# Patient Record
Sex: MALE | Race: WHITE | NOT HISPANIC OR LATINO | Employment: OTHER | URBAN - NONMETROPOLITAN AREA
[De-identification: names, ages, dates, MRNs, and addresses within clinical notes are randomized per-mention and may not be internally consistent; named-entity substitution may affect disease eponyms.]

---

## 2018-06-18 ENCOUNTER — TRANSCRIBE ORDERS (OUTPATIENT)
Dept: ADMINISTRATIVE | Facility: HOSPITAL | Age: 68
End: 2018-06-18

## 2018-06-18 DIAGNOSIS — R07.9 CHEST PAIN, UNSPECIFIED TYPE: Primary | ICD-10-CM

## 2018-06-18 DIAGNOSIS — R53.83 LETHARGY: ICD-10-CM

## 2018-06-19 ENCOUNTER — HOSPITAL ENCOUNTER (OUTPATIENT)
Dept: CARDIOLOGY | Facility: HOSPITAL | Age: 68
Discharge: HOME | End: 2018-06-19
Admitting: GENERAL PRACTICE

## 2018-06-19 VITALS
BODY MASS INDEX: 26.21 KG/M2 | HEART RATE: 68 BPM | SYSTOLIC BLOOD PRESSURE: 168 MMHG | DIASTOLIC BLOOD PRESSURE: 109 MMHG | HEIGHT: 67 IN | WEIGHT: 167 LBS

## 2018-06-19 DIAGNOSIS — R07.9 CHEST PAIN, UNSPECIFIED TYPE: ICD-10-CM

## 2018-06-19 DIAGNOSIS — R53.83 LETHARGY: ICD-10-CM

## 2018-06-19 PROCEDURE — 93350 STRESS TTE ONLY: CPT | Performed by: INTERNAL MEDICINE

## 2018-06-19 PROCEDURE — 25010000002 PERFLUTREN 6.52 MG/ML SUSPENSION: Performed by: INTERNAL MEDICINE

## 2018-06-19 PROCEDURE — 93018 CV STRESS TEST I&R ONLY: CPT | Performed by: INTERNAL MEDICINE

## 2018-06-19 PROCEDURE — 93350 STRESS TTE ONLY: CPT

## 2018-06-19 PROCEDURE — 93352 ADMIN ECG CONTRAST AGENT: CPT | Performed by: INTERNAL MEDICINE

## 2018-06-19 PROCEDURE — 93017 CV STRESS TEST TRACING ONLY: CPT

## 2018-06-19 RX ORDER — HYDROCODONE BITARTRATE AND ACETAMINOPHEN 5; 325 MG/1; MG/1
1 TABLET ORAL EVERY 6 HOURS PRN
COMMUNITY

## 2018-06-19 RX ADMIN — PERFLUTREN 8.48 MG: 6.52 INJECTION, SUSPENSION INTRAVENOUS at 13:49

## 2018-06-21 LAB
BH CV STRESS BP STAGE 1: NORMAL
BH CV STRESS BP STAGE 2: NORMAL
BH CV STRESS DURATION MIN STAGE 1: 3
BH CV STRESS DURATION MIN STAGE 2: 2
BH CV STRESS DURATION SEC STAGE 1: 0
BH CV STRESS DURATION SEC STAGE 2: 22
BH CV STRESS ECHO POST STRESS EJECTION FRACTION EF: 65 %
BH CV STRESS GRADE STAGE 1: 10
BH CV STRESS GRADE STAGE 2: 12
BH CV STRESS HR STAGE 1: 112
BH CV STRESS HR STAGE 2: 125
BH CV STRESS METS STAGE 1: 5
BH CV STRESS METS STAGE 2: 7.5
BH CV STRESS PROTOCOL 1: NORMAL
BH CV STRESS RECOVERY BP: NORMAL MMHG
BH CV STRESS RECOVERY HR: 80 BPM
BH CV STRESS SPEED STAGE 1: 1.7
BH CV STRESS SPEED STAGE 2: 2.5
BH CV STRESS STAGE 1: 1
BH CV STRESS STAGE 2: 2
LV EF 2D ECHO EST: 55 %
MAXIMAL PREDICTED HEART RATE: 151 BPM
PERCENT MAX PREDICTED HR: 82.78 %
STRESS BASELINE BP: NORMAL MMHG
STRESS BASELINE HR: 83 BPM
STRESS PERCENT HR: 97 %
STRESS POST ESTIMATED WORKLOAD: 7.5 METS
STRESS POST EXERCISE DUR MIN: 5 MIN
STRESS POST EXERCISE DUR SEC: 22 SEC
STRESS POST PEAK BP: NORMAL MMHG
STRESS POST PEAK HR: 125 BPM
STRESS TARGET HR: 128 BPM

## 2019-04-23 ENCOUNTER — TRANSCRIBE ORDERS (OUTPATIENT)
Dept: ADMINISTRATIVE | Facility: HOSPITAL | Age: 69
End: 2019-04-23

## 2019-04-23 DIAGNOSIS — R10.11 ABDOMINAL PAIN, RIGHT UPPER QUADRANT: Primary | ICD-10-CM

## 2019-04-26 ENCOUNTER — HOSPITAL ENCOUNTER (OUTPATIENT)
Dept: ULTRASOUND IMAGING | Facility: HOSPITAL | Age: 69
Discharge: HOME | End: 2019-04-26
Admitting: GENERAL PRACTICE

## 2019-04-26 PROCEDURE — 76705 ECHO EXAM OF ABDOMEN: CPT

## 2019-05-06 NOTE — H&P (VIEW-ONLY)
"Chief Complaint   Patient presents with   • Heartburn     having bad acid reflux bad running down arms        PCP: Fazal Killian MD  REFER: Fazal Killian MD    Subjective     HPI    Chest pain fall 2018 with negative cardiac workup.  Over past several week increasing YULIANA pain described as \"squeezing.\"  Pain is intermittent worse with bending over and activity.  Pain radiates down both arms.  Rest provides relief as does belching.  He has intermittent globus sensation in mid esophagus.  No dysphagia.  No heartburn.  Denies difficulty with heartburn in past.    Started on PPI by PCP 2-3 weeks ago with some relief.    He consumes etoh on regular basis.  No weight loss.     Last colonoscopy 2009 by Dr Murphy with tubular adenoma polyps removed.     4/26/19-mild hepatic steatosis suspected, otherwise negative     Past Medical History:   Diagnosis Date   • Diverticulitis    • Hyperlipidemia    • Hypertension        Past Surgical History:   Procedure Laterality Date   • NECK SURGERY         Outpatient Medications Marked as Taking for the 5/7/19 encounter (Office Visit) with Erick Blackmon APRN   Medication Sig Dispense Refill   • aspirin 81 MG EC tablet Take 81 mg by mouth Daily.     • HYDROcodone-acetaminophen (NORCO) 5-325 MG per tablet Take 1 tablet by mouth Every 6 (Six) Hours As Needed.     • lisinopril (PRINIVIL,ZESTRIL) 20 MG tablet   5   • omeprazole (priLOSEC) 20 MG capsule   5       No Known Allergies    Social History     Socioeconomic History   • Marital status:      Spouse name: Not on file   • Number of children: Not on file   • Years of education: Not on file   • Highest education level: Not on file   Tobacco Use   • Smoking status: Current Some Day Smoker     Packs/day: 0.25     Years: 10.00     Pack years: 2.50   • Smokeless tobacco: Never Used   Substance and Sexual Activity   • Alcohol use: Yes     Comment: every other day beer   • Drug use: No       Family History   Problem " "Relation Age of Onset   • Heart disease Father    • Heart attack Paternal Uncle    • Heart disease Paternal Uncle    • Heart disease Maternal Grandmother    • Heart disease Paternal Grandmother    • Colon polyps Neg Hx    • Colon cancer Neg Hx    • Esophageal cancer Neg Hx        Review of Systems   Constitutional: Negative for fatigue, fever and unexpected weight change.   HENT: Negative for hearing loss, sore throat and voice change.    Eyes: Negative for visual disturbance.   Respiratory: Negative for cough, shortness of breath and wheezing.    Cardiovascular: Negative for chest pain and palpitations.   Gastrointestinal: Positive for abdominal pain. Negative for blood in stool and vomiting.   Endocrine: Negative for polydipsia and polyuria.   Genitourinary: Negative for difficulty urinating, dysuria, hematuria and urgency.   Musculoskeletal: Negative for joint swelling and myalgias.   Skin: Negative for color change, rash and wound.   Neurological: Negative for dizziness, tremors, seizures and syncope.   Hematological: Does not bruise/bleed easily.   Psychiatric/Behavioral: Negative for agitation and confusion. The patient is not nervous/anxious.        Objective     Vitals:    05/07/19 0843   BP: 150/96   Pulse: 62   Temp: 97.4 °F (36.3 °C)   SpO2: 99%   Weight: 72.6 kg (160 lb)   Height: 170.2 cm (67\")     Body mass index is 25.06 kg/m².    Physical Exam   Constitutional: He is oriented to person, place, and time. He appears well-developed and well-nourished. He is cooperative.   HENT:   Head: Normocephalic and atraumatic.   Eyes: Conjunctivae are normal. Pupils are equal, round, and reactive to light. No scleral icterus.   Neck: Normal range of motion. Neck supple. No JVD present. No thyroid mass and no thyromegaly present.   Cardiovascular: Normal rate, regular rhythm and normal heart sounds. Exam reveals no gallop and no friction rub.   No murmur heard.  Pulmonary/Chest: Effort normal and breath sounds " normal. No accessory muscle usage. No respiratory distress. He has no wheezes. He has no rales.   Abdominal: Soft. Normal appearance and bowel sounds are normal. He exhibits no distension, no ascites and no mass. There is no hepatosplenomegaly. There is no tenderness (YULIANA). There is no rebound and no guarding.   Musculoskeletal: Normal range of motion. He exhibits no edema or tenderness.     Vascular Status -  His right foot exhibits normal foot vasculature  and no edema. His left foot exhibits normal foot vasculature  and no edema.  Lymphadenopathy:     He has no cervical adenopathy.   Neurological: He is alert and oriented to person, place, and time. He has normal strength. Gait normal.   Skin: Skin is warm, dry and intact. No rash noted.       Imaging Results (most recent)     None          Body mass index is 25.06 kg/m².    Assessment/Plan     Otilio was seen today for heartburn.    Diagnoses and all orders for this visit:    Right upper quadrant abdominal pain  -     Case Request; Standing  -     Implement Anesthesia Orders Day of Procedure; Standing  -     Obtain Informed Consent; Standing  -     Case Request    History of adenomatous polyp of colon  -     Case Request; Standing  -     Implement Anesthesia Orders Day of Procedure; Standing  -     Obtain Informed Consent; Standing  -     Case Request        COLONOSCOPY WITH ANESTHESIA (N/A)   2. ENDOSCOPY WITH ANESTHESIA    Take PPI 30 min prior to breakfast   Avoid lying down 2-3 hours within eating  Decrease caffeine intake, etoh use nicotine     Explained he is at risk for polyps due to previous polyps.  Polyps have potential of developing into cancer if not removed.  Pt and wife verbalized understanding.     Advised pt to stop ASA, use of NSAIDs, Fish Oil, and MV 5 days prior to procedure, per Dr Ashley protocol.  Tylenol based products are ok to take.  Pt verbalized understanding.    Patient is to continue all blood pressure and cardiac medications prior  to procedure and has been advised to take medications morning of procedure   Pt verbalized understanding    All risks, benefits, alternatives, and indications of colonoscopy procedure have been discussed with the patient. Risks to include perforation of the colon requiring possible surgery or colostomy, risk of bleeding from biopsies or removal of colon tissue, possibility of missing a colon polyp or cancer, or adverse drug reaction.  Benefits to include the diagnosis and management of disease of the colon and rectum. Alternatives to include barium enema, radiographic evaluation, lab testing or no intervention. Pt verbalizes understanding and agrees to proceed with procedure.    The risk of the endoscopy were discussed in detail.  We discussed the risk of perforation (one out of 1529-4677, riskier with dilation), bleeding (one out of 500), and the rare risks of infection, adverse reaction to anesthesia, respiratory failure, cardiac failure including MI and adverse reaction to medications, etc.  We discussed consequences that could occur if a risk were to develop such as the need for hospitalization, blood transfusion, surgical intervention, medications, pain and disability and death.  Alternatives include not doing anything, or pursuing an UGI series which only offers a diagnosis with potential less accuracy compared to egd.  The patient verbalizes understanding and agrees to proceed.    Patient's Body mass index is 25.06 kg/m². BMI is above normal parameters. Recommendations include: no follow up.      There are no Patient Instructions on file for this visit.

## 2019-05-06 NOTE — PROGRESS NOTES
"Chief Complaint   Patient presents with   • Heartburn     having bad acid reflux bad running down arms        PCP: Fazal Killian MD  REFER: Fazal Killian MD    Subjective     HPI    Chest pain fall 2018 with negative cardiac workup.  Over past several week increasing YULIANA pain described as \"squeezing.\"  Pain is intermittent worse with bending over and activity.  Pain radiates down both arms.  Rest provides relief as does belching.  He has intermittent globus sensation in mid esophagus.  No dysphagia.  No heartburn.  Denies difficulty with heartburn in past.    Started on PPI by PCP 2-3 weeks ago with some relief.    He consumes etoh on regular basis.  No weight loss.     Last colonoscopy 2009 by Dr Murphy with tubular adenoma polyps removed.     4/26/19-mild hepatic steatosis suspected, otherwise negative     Past Medical History:   Diagnosis Date   • Diverticulitis    • Hyperlipidemia    • Hypertension        Past Surgical History:   Procedure Laterality Date   • NECK SURGERY         Outpatient Medications Marked as Taking for the 5/7/19 encounter (Office Visit) with Erick Blackmon APRN   Medication Sig Dispense Refill   • aspirin 81 MG EC tablet Take 81 mg by mouth Daily.     • HYDROcodone-acetaminophen (NORCO) 5-325 MG per tablet Take 1 tablet by mouth Every 6 (Six) Hours As Needed.     • lisinopril (PRINIVIL,ZESTRIL) 20 MG tablet   5   • omeprazole (priLOSEC) 20 MG capsule   5       No Known Allergies    Social History     Socioeconomic History   • Marital status:      Spouse name: Not on file   • Number of children: Not on file   • Years of education: Not on file   • Highest education level: Not on file   Tobacco Use   • Smoking status: Current Some Day Smoker     Packs/day: 0.25     Years: 10.00     Pack years: 2.50   • Smokeless tobacco: Never Used   Substance and Sexual Activity   • Alcohol use: Yes     Comment: every other day beer   • Drug use: No       Family History   Problem " "Relation Age of Onset   • Heart disease Father    • Heart attack Paternal Uncle    • Heart disease Paternal Uncle    • Heart disease Maternal Grandmother    • Heart disease Paternal Grandmother    • Colon polyps Neg Hx    • Colon cancer Neg Hx    • Esophageal cancer Neg Hx        Review of Systems   Constitutional: Negative for fatigue, fever and unexpected weight change.   HENT: Negative for hearing loss, sore throat and voice change.    Eyes: Negative for visual disturbance.   Respiratory: Negative for cough, shortness of breath and wheezing.    Cardiovascular: Negative for chest pain and palpitations.   Gastrointestinal: Positive for abdominal pain. Negative for blood in stool and vomiting.   Endocrine: Negative for polydipsia and polyuria.   Genitourinary: Negative for difficulty urinating, dysuria, hematuria and urgency.   Musculoskeletal: Negative for joint swelling and myalgias.   Skin: Negative for color change, rash and wound.   Neurological: Negative for dizziness, tremors, seizures and syncope.   Hematological: Does not bruise/bleed easily.   Psychiatric/Behavioral: Negative for agitation and confusion. The patient is not nervous/anxious.        Objective     Vitals:    05/07/19 0843   BP: 150/96   Pulse: 62   Temp: 97.4 °F (36.3 °C)   SpO2: 99%   Weight: 72.6 kg (160 lb)   Height: 170.2 cm (67\")     Body mass index is 25.06 kg/m².    Physical Exam   Constitutional: He is oriented to person, place, and time. He appears well-developed and well-nourished. He is cooperative.   HENT:   Head: Normocephalic and atraumatic.   Eyes: Conjunctivae are normal. Pupils are equal, round, and reactive to light. No scleral icterus.   Neck: Normal range of motion. Neck supple. No JVD present. No thyroid mass and no thyromegaly present.   Cardiovascular: Normal rate, regular rhythm and normal heart sounds. Exam reveals no gallop and no friction rub.   No murmur heard.  Pulmonary/Chest: Effort normal and breath sounds " normal. No accessory muscle usage. No respiratory distress. He has no wheezes. He has no rales.   Abdominal: Soft. Normal appearance and bowel sounds are normal. He exhibits no distension, no ascites and no mass. There is no hepatosplenomegaly. There is no tenderness (YULIANA). There is no rebound and no guarding.   Musculoskeletal: Normal range of motion. He exhibits no edema or tenderness.     Vascular Status -  His right foot exhibits normal foot vasculature  and no edema. His left foot exhibits normal foot vasculature  and no edema.  Lymphadenopathy:     He has no cervical adenopathy.   Neurological: He is alert and oriented to person, place, and time. He has normal strength. Gait normal.   Skin: Skin is warm, dry and intact. No rash noted.       Imaging Results (most recent)     None          Body mass index is 25.06 kg/m².    Assessment/Plan     Otilio was seen today for heartburn.    Diagnoses and all orders for this visit:    Right upper quadrant abdominal pain  -     Case Request; Standing  -     Implement Anesthesia Orders Day of Procedure; Standing  -     Obtain Informed Consent; Standing  -     Case Request    History of adenomatous polyp of colon  -     Case Request; Standing  -     Implement Anesthesia Orders Day of Procedure; Standing  -     Obtain Informed Consent; Standing  -     Case Request        COLONOSCOPY WITH ANESTHESIA (N/A)   2. ENDOSCOPY WITH ANESTHESIA    Take PPI 30 min prior to breakfast   Avoid lying down 2-3 hours within eating  Decrease caffeine intake, etoh use nicotine     Explained he is at risk for polyps due to previous polyps.  Polyps have potential of developing into cancer if not removed.  Pt and wife verbalized understanding.     Advised pt to stop ASA, use of NSAIDs, Fish Oil, and MV 5 days prior to procedure, per Dr Ashley protocol.  Tylenol based products are ok to take.  Pt verbalized understanding.    Patient is to continue all blood pressure and cardiac medications prior  to procedure and has been advised to take medications morning of procedure   Pt verbalized understanding    All risks, benefits, alternatives, and indications of colonoscopy procedure have been discussed with the patient. Risks to include perforation of the colon requiring possible surgery or colostomy, risk of bleeding from biopsies or removal of colon tissue, possibility of missing a colon polyp or cancer, or adverse drug reaction.  Benefits to include the diagnosis and management of disease of the colon and rectum. Alternatives to include barium enema, radiographic evaluation, lab testing or no intervention. Pt verbalizes understanding and agrees to proceed with procedure.    The risk of the endoscopy were discussed in detail.  We discussed the risk of perforation (one out of 0988-9012, riskier with dilation), bleeding (one out of 500), and the rare risks of infection, adverse reaction to anesthesia, respiratory failure, cardiac failure including MI and adverse reaction to medications, etc.  We discussed consequences that could occur if a risk were to develop such as the need for hospitalization, blood transfusion, surgical intervention, medications, pain and disability and death.  Alternatives include not doing anything, or pursuing an UGI series which only offers a diagnosis with potential less accuracy compared to egd.  The patient verbalizes understanding and agrees to proceed.    Patient's Body mass index is 25.06 kg/m². BMI is above normal parameters. Recommendations include: no follow up.      There are no Patient Instructions on file for this visit.

## 2019-05-06 NOTE — H&P (VIEW-ONLY)
"Chief Complaint   Patient presents with   • Heartburn     having bad acid reflux bad running down arms        PCP: Fazal Killian MD  REFER: Fazal Killian MD    Subjective     HPI    Chest pain fall 2018 with negative cardiac workup.  Over past several week increasing YULIANA pain described as \"squeezing.\"  Pain is intermittent worse with bending over and activity.  Pain radiates down both arms.  Rest provides relief as does belching.  He has intermittent globus sensation in mid esophagus.  No dysphagia.  No heartburn.  Denies difficulty with heartburn in past.    Started on PPI by PCP 2-3 weeks ago with some relief.    He consumes etoh on regular basis.  No weight loss.     Last colonoscopy 2009 by Dr Murphy with tubular adenoma polyps removed.     4/26/19-mild hepatic steatosis suspected, otherwise negative     Past Medical History:   Diagnosis Date   • Diverticulitis    • Hyperlipidemia    • Hypertension        Past Surgical History:   Procedure Laterality Date   • NECK SURGERY         Outpatient Medications Marked as Taking for the 5/7/19 encounter (Office Visit) with Erick Blackmon APRN   Medication Sig Dispense Refill   • aspirin 81 MG EC tablet Take 81 mg by mouth Daily.     • HYDROcodone-acetaminophen (NORCO) 5-325 MG per tablet Take 1 tablet by mouth Every 6 (Six) Hours As Needed.     • lisinopril (PRINIVIL,ZESTRIL) 20 MG tablet   5   • omeprazole (priLOSEC) 20 MG capsule   5       No Known Allergies    Social History     Socioeconomic History   • Marital status:      Spouse name: Not on file   • Number of children: Not on file   • Years of education: Not on file   • Highest education level: Not on file   Tobacco Use   • Smoking status: Current Some Day Smoker     Packs/day: 0.25     Years: 10.00     Pack years: 2.50   • Smokeless tobacco: Never Used   Substance and Sexual Activity   • Alcohol use: Yes     Comment: every other day beer   • Drug use: No       Family History   Problem " "Relation Age of Onset   • Heart disease Father    • Heart attack Paternal Uncle    • Heart disease Paternal Uncle    • Heart disease Maternal Grandmother    • Heart disease Paternal Grandmother    • Colon polyps Neg Hx    • Colon cancer Neg Hx    • Esophageal cancer Neg Hx        Review of Systems   Constitutional: Negative for fatigue, fever and unexpected weight change.   HENT: Negative for hearing loss, sore throat and voice change.    Eyes: Negative for visual disturbance.   Respiratory: Negative for cough, shortness of breath and wheezing.    Cardiovascular: Negative for chest pain and palpitations.   Gastrointestinal: Positive for abdominal pain. Negative for blood in stool and vomiting.   Endocrine: Negative for polydipsia and polyuria.   Genitourinary: Negative for difficulty urinating, dysuria, hematuria and urgency.   Musculoskeletal: Negative for joint swelling and myalgias.   Skin: Negative for color change, rash and wound.   Neurological: Negative for dizziness, tremors, seizures and syncope.   Hematological: Does not bruise/bleed easily.   Psychiatric/Behavioral: Negative for agitation and confusion. The patient is not nervous/anxious.        Objective     Vitals:    05/07/19 0843   BP: 150/96   Pulse: 62   Temp: 97.4 °F (36.3 °C)   SpO2: 99%   Weight: 72.6 kg (160 lb)   Height: 170.2 cm (67\")     Body mass index is 25.06 kg/m².    Physical Exam   Constitutional: He is oriented to person, place, and time. He appears well-developed and well-nourished. He is cooperative.   HENT:   Head: Normocephalic and atraumatic.   Eyes: Conjunctivae are normal. Pupils are equal, round, and reactive to light. No scleral icterus.   Neck: Normal range of motion. Neck supple. No JVD present. No thyroid mass and no thyromegaly present.   Cardiovascular: Normal rate, regular rhythm and normal heart sounds. Exam reveals no gallop and no friction rub.   No murmur heard.  Pulmonary/Chest: Effort normal and breath sounds " normal. No accessory muscle usage. No respiratory distress. He has no wheezes. He has no rales.   Abdominal: Soft. Normal appearance and bowel sounds are normal. He exhibits no distension, no ascites and no mass. There is no hepatosplenomegaly. There is no tenderness (YULIANA). There is no rebound and no guarding.   Musculoskeletal: Normal range of motion. He exhibits no edema or tenderness.     Vascular Status -  His right foot exhibits normal foot vasculature  and no edema. His left foot exhibits normal foot vasculature  and no edema.  Lymphadenopathy:     He has no cervical adenopathy.   Neurological: He is alert and oriented to person, place, and time. He has normal strength. Gait normal.   Skin: Skin is warm, dry and intact. No rash noted.       Imaging Results (most recent)     None          Body mass index is 25.06 kg/m².    Assessment/Plan     Otilio was seen today for heartburn.    Diagnoses and all orders for this visit:    Right upper quadrant abdominal pain  -     Case Request; Standing  -     Implement Anesthesia Orders Day of Procedure; Standing  -     Obtain Informed Consent; Standing  -     Case Request    History of adenomatous polyp of colon  -     Case Request; Standing  -     Implement Anesthesia Orders Day of Procedure; Standing  -     Obtain Informed Consent; Standing  -     Case Request        COLONOSCOPY WITH ANESTHESIA (N/A)   2. ENDOSCOPY WITH ANESTHESIA    Take PPI 30 min prior to breakfast   Avoid lying down 2-3 hours within eating  Decrease caffeine intake, etoh use nicotine     Explained he is at risk for polyps due to previous polyps.  Polyps have potential of developing into cancer if not removed.  Pt and wife verbalized understanding.     Advised pt to stop ASA, use of NSAIDs, Fish Oil, and MV 5 days prior to procedure, per Dr Ashley protocol.  Tylenol based products are ok to take.  Pt verbalized understanding.    Patient is to continue all blood pressure and cardiac medications prior  to procedure and has been advised to take medications morning of procedure   Pt verbalized understanding    All risks, benefits, alternatives, and indications of colonoscopy procedure have been discussed with the patient. Risks to include perforation of the colon requiring possible surgery or colostomy, risk of bleeding from biopsies or removal of colon tissue, possibility of missing a colon polyp or cancer, or adverse drug reaction.  Benefits to include the diagnosis and management of disease of the colon and rectum. Alternatives to include barium enema, radiographic evaluation, lab testing or no intervention. Pt verbalizes understanding and agrees to proceed with procedure.    The risk of the endoscopy were discussed in detail.  We discussed the risk of perforation (one out of 6874-6225, riskier with dilation), bleeding (one out of 500), and the rare risks of infection, adverse reaction to anesthesia, respiratory failure, cardiac failure including MI and adverse reaction to medications, etc.  We discussed consequences that could occur if a risk were to develop such as the need for hospitalization, blood transfusion, surgical intervention, medications, pain and disability and death.  Alternatives include not doing anything, or pursuing an UGI series which only offers a diagnosis with potential less accuracy compared to egd.  The patient verbalizes understanding and agrees to proceed.    Patient's Body mass index is 25.06 kg/m². BMI is above normal parameters. Recommendations include: no follow up.      There are no Patient Instructions on file for this visit.

## 2019-05-07 ENCOUNTER — OFFICE VISIT (OUTPATIENT)
Dept: GASTROENTEROLOGY | Facility: CLINIC | Age: 69
End: 2019-05-07

## 2019-05-07 VITALS
BODY MASS INDEX: 25.11 KG/M2 | HEART RATE: 62 BPM | DIASTOLIC BLOOD PRESSURE: 96 MMHG | HEIGHT: 67 IN | WEIGHT: 160 LBS | TEMPERATURE: 97.4 F | SYSTOLIC BLOOD PRESSURE: 150 MMHG | OXYGEN SATURATION: 99 %

## 2019-05-07 DIAGNOSIS — R10.11 RIGHT UPPER QUADRANT ABDOMINAL PAIN: Primary | ICD-10-CM

## 2019-05-07 DIAGNOSIS — Z86.010 HISTORY OF ADENOMATOUS POLYP OF COLON: ICD-10-CM

## 2019-05-07 PROCEDURE — 99204 OFFICE O/P NEW MOD 45 MIN: CPT | Performed by: NURSE PRACTITIONER

## 2019-05-07 RX ORDER — ASPIRIN 81 MG/1
81 TABLET ORAL DAILY
COMMUNITY

## 2019-05-07 RX ORDER — LISINOPRIL 20 MG/1
20 TABLET ORAL DAILY
Refills: 5 | COMMUNITY
Start: 2019-04-12

## 2019-05-07 RX ORDER — OMEPRAZOLE 20 MG/1
CAPSULE, DELAYED RELEASE ORAL
Refills: 5 | Status: ON HOLD | COMMUNITY
Start: 2019-04-12 | End: 2019-05-08 | Stop reason: SDUPTHER

## 2019-05-08 ENCOUNTER — ANESTHESIA EVENT (OUTPATIENT)
Dept: GASTROENTEROLOGY | Facility: HOSPITAL | Age: 69
End: 2019-05-08

## 2019-05-08 ENCOUNTER — ANESTHESIA (OUTPATIENT)
Dept: GASTROENTEROLOGY | Facility: HOSPITAL | Age: 69
End: 2019-05-08

## 2019-05-08 ENCOUNTER — HOSPITAL ENCOUNTER (OUTPATIENT)
Facility: HOSPITAL | Age: 69
Setting detail: HOSPITAL OUTPATIENT SURGERY
Discharge: HOME | End: 2019-05-08
Attending: INTERNAL MEDICINE | Admitting: INTERNAL MEDICINE

## 2019-05-08 VITALS
WEIGHT: 155 LBS | BODY MASS INDEX: 24.33 KG/M2 | DIASTOLIC BLOOD PRESSURE: 86 MMHG | HEIGHT: 67 IN | HEART RATE: 59 BPM | TEMPERATURE: 97.3 F | RESPIRATION RATE: 15 BRPM | SYSTOLIC BLOOD PRESSURE: 126 MMHG | OXYGEN SATURATION: 99 %

## 2019-05-08 DIAGNOSIS — R10.11 RIGHT UPPER QUADRANT ABDOMINAL PAIN: ICD-10-CM

## 2019-05-08 PROCEDURE — 87081 CULTURE SCREEN ONLY: CPT | Performed by: INTERNAL MEDICINE

## 2019-05-08 PROCEDURE — 25010000002 PROPOFOL 10 MG/ML EMULSION: Performed by: NURSE ANESTHETIST, CERTIFIED REGISTERED

## 2019-05-08 PROCEDURE — 43239 EGD BIOPSY SINGLE/MULTIPLE: CPT | Performed by: INTERNAL MEDICINE

## 2019-05-08 RX ORDER — SODIUM CHLORIDE 0.9 % (FLUSH) 0.9 %
3-10 SYRINGE (ML) INJECTION AS NEEDED
Status: DISCONTINUED | OUTPATIENT
Start: 2019-05-08 | End: 2019-05-08 | Stop reason: HOSPADM

## 2019-05-08 RX ORDER — SODIUM CHLORIDE 9 MG/ML
100 INJECTION, SOLUTION INTRAVENOUS CONTINUOUS
Status: DISCONTINUED | OUTPATIENT
Start: 2019-05-08 | End: 2019-05-08 | Stop reason: HOSPADM

## 2019-05-08 RX ORDER — PROPOFOL 10 MG/ML
VIAL (ML) INTRAVENOUS AS NEEDED
Status: DISCONTINUED | OUTPATIENT
Start: 2019-05-08 | End: 2019-05-08 | Stop reason: SURG

## 2019-05-08 RX ORDER — LIDOCAINE HYDROCHLORIDE 20 MG/ML
INJECTION, SOLUTION INFILTRATION; PERINEURAL AS NEEDED
Status: DISCONTINUED | OUTPATIENT
Start: 2019-05-08 | End: 2019-05-08 | Stop reason: SURG

## 2019-05-08 RX ORDER — SODIUM CHLORIDE 0.9 % (FLUSH) 0.9 %
3 SYRINGE (ML) INJECTION EVERY 12 HOURS SCHEDULED
Status: CANCELLED | OUTPATIENT
Start: 2019-05-08

## 2019-05-08 RX ORDER — OMEPRAZOLE 20 MG/1
40 CAPSULE, DELAYED RELEASE ORAL
Qty: 60 CAPSULE | Refills: 5 | Status: SHIPPED | OUTPATIENT
Start: 2019-05-08 | End: 2019-05-08 | Stop reason: DRUGHIGH

## 2019-05-08 RX ORDER — OMEPRAZOLE 40 MG/1
40 CAPSULE, DELAYED RELEASE ORAL 2 TIMES DAILY
Qty: 60 CAPSULE | Refills: 11 | Status: SHIPPED | OUTPATIENT
Start: 2019-05-08 | End: 2019-06-07

## 2019-05-08 RX ADMIN — SODIUM CHLORIDE 100 ML/HR: 9 INJECTION, SOLUTION INTRAVENOUS at 07:14

## 2019-05-08 RX ADMIN — PROPOFOL 200 MG: 10 INJECTION, EMULSION INTRAVENOUS at 07:38

## 2019-05-08 RX ADMIN — LIDOCAINE HYDROCHLORIDE 200 MG: 20 INJECTION, SOLUTION INFILTRATION; PERINEURAL at 07:38

## 2019-05-08 NOTE — ANESTHESIA POSTPROCEDURE EVALUATION
"Patient: Otilio Amaral    Procedure Summary     Date:  05/08/19 Room / Location:  [facility] ENDOSCOPY 5 / [facility] END[facility]    Anesthesia Start:  0734 Anesthesia Stop:  0744    Procedure:  ESOPHAGOGASTRODUODENOSCOPY WITH ANESTHESIA (N/A ) Diagnosis:       Right upper quadrant abdominal pain      (Right upper quadrant abdominal pain [R10.11])    Surgeon:  Venu Ashley DO Provider:  Vijay Cornell CRNA    Anesthesia Type:  MAC ASA Status:  2          Anesthesia Type: MAC  Last vitals  BP   157/85 (05/08/19 0655)   Temp   97.3 °F (36.3 °C) (05/08/19 0655)   Pulse   55 (05/08/19 0655)   Resp   18 (05/08/19 0655)     SpO2   100 % (05/08/19 0655)     Post Anesthesia Care and Evaluation    Patient location during evaluation: PHASE II  Patient participation: complete - patient participated  Level of consciousness: awake and alert  Pain management: adequate  Airway patency: patent  Anesthetic complications: No anesthetic complications    Cardiovascular status: acceptable  Respiratory status: acceptable  Hydration status: acceptable    Comments: Blood pressure 157/85, pulse 55, temperature 97.3 °F (36.3 °C), temperature source Temporal, resp. rate 18, height 170.2 cm (67\"), weight 70.3 kg (155 lb), SpO2 100 %.    Pt discharged from PACU based on cb score >8      "

## 2019-05-08 NOTE — ANESTHESIA PREPROCEDURE EVALUATION
Anesthesia Evaluation     Patient summary reviewed and Nursing notes reviewed   NPO Solid Status: > 8 hours  NPO Liquid Status: > 8 hours           Airway   Mallampati: I  TM distance: >3 FB  Neck ROM: full  No difficulty expected  Dental - normal exam     Pulmonary - normal exam   (+) a smoker Current Smoked day of surgery,   Cardiovascular - normal exam  Exercise tolerance: good (4-7 METS)    (+) hypertension well controlled less than 2 medications, hyperlipidemia,       Neuro/Psych- negative ROS  GI/Hepatic/Renal/Endo    (+)  GERD well controlled,      Musculoskeletal     (+) arthralgias,   Abdominal  - normal exam   Substance History - negative use     OB/GYN          Other   (+) arthritis                     Anesthesia Plan    ASA 2     MAC   total IV anesthesia  intravenous induction   Anesthetic plan, all risks, benefits, and alternatives have been provided, discussed and informed consent has been obtained with: patient and spouse/significant other.

## 2019-05-09 LAB — UREASE TISS QL: NEGATIVE

## 2019-05-13 ENCOUNTER — TELEPHONE (OUTPATIENT)
Dept: GASTROENTEROLOGY | Facility: CLINIC | Age: 69
End: 2019-05-13

## 2019-05-13 NOTE — TELEPHONE ENCOUNTER
milagros wife called and stated patient was still in a lot of pain.everytime he eats he is hurting.she said he is taking the prilosec 40mg bid and that seems to not be helping.what do they need to do?

## 2019-05-14 NOTE — TELEPHONE ENCOUNTER
I can send carafate in to see this helps provide relief  Suggest he keep appointment for colonoscopy  Further recommendations pending result of colonoscopy    Thank you

## 2019-05-16 ENCOUNTER — ANESTHESIA EVENT (OUTPATIENT)
Dept: GASTROENTEROLOGY | Facility: HOSPITAL | Age: 69
End: 2019-05-16

## 2019-05-16 ENCOUNTER — ANESTHESIA (OUTPATIENT)
Dept: GASTROENTEROLOGY | Facility: HOSPITAL | Age: 69
End: 2019-05-16

## 2019-05-16 ENCOUNTER — HOSPITAL ENCOUNTER (OUTPATIENT)
Facility: HOSPITAL | Age: 69
Setting detail: HOSPITAL OUTPATIENT SURGERY
Discharge: HOME | End: 2019-05-16
Attending: INTERNAL MEDICINE | Admitting: INTERNAL MEDICINE

## 2019-05-16 VITALS
SYSTOLIC BLOOD PRESSURE: 92 MMHG | WEIGHT: 154 LBS | HEART RATE: 63 BPM | BODY MASS INDEX: 24.17 KG/M2 | HEIGHT: 67 IN | RESPIRATION RATE: 15 BRPM | TEMPERATURE: 97.7 F | OXYGEN SATURATION: 96 % | DIASTOLIC BLOOD PRESSURE: 68 MMHG

## 2019-05-16 DIAGNOSIS — Z86.010 HISTORY OF ADENOMATOUS POLYP OF COLON: ICD-10-CM

## 2019-05-16 PROCEDURE — 25010000002 PROPOFOL 10 MG/ML EMULSION: Performed by: NURSE ANESTHETIST, CERTIFIED REGISTERED

## 2019-05-16 PROCEDURE — 45385 COLONOSCOPY W/LESION REMOVAL: CPT | Performed by: INTERNAL MEDICINE

## 2019-05-16 RX ORDER — SODIUM CHLORIDE 0.9 % (FLUSH) 0.9 %
3 SYRINGE (ML) INJECTION AS NEEDED
Status: DISCONTINUED | OUTPATIENT
Start: 2019-05-16 | End: 2019-05-16 | Stop reason: HOSPADM

## 2019-05-16 RX ORDER — SODIUM CHLORIDE 9 MG/ML
500 INJECTION, SOLUTION INTRAVENOUS CONTINUOUS PRN
Status: DISCONTINUED | OUTPATIENT
Start: 2019-05-16 | End: 2019-05-16 | Stop reason: HOSPADM

## 2019-05-16 RX ORDER — LIDOCAINE HYDROCHLORIDE 20 MG/ML
INJECTION, SOLUTION INFILTRATION; PERINEURAL AS NEEDED
Status: DISCONTINUED | OUTPATIENT
Start: 2019-05-16 | End: 2019-05-16 | Stop reason: SURG

## 2019-05-16 RX ORDER — PROPOFOL 10 MG/ML
VIAL (ML) INTRAVENOUS AS NEEDED
Status: DISCONTINUED | OUTPATIENT
Start: 2019-05-16 | End: 2019-05-16 | Stop reason: SURG

## 2019-05-16 RX ADMIN — SODIUM CHLORIDE 500 ML: 9 INJECTION, SOLUTION INTRAVENOUS at 09:25

## 2019-05-16 RX ADMIN — PROPOFOL 40 MG: 10 INJECTION, EMULSION INTRAVENOUS at 10:59

## 2019-05-16 RX ADMIN — PROPOFOL 70 MG: 10 INJECTION, EMULSION INTRAVENOUS at 10:58

## 2019-05-16 RX ADMIN — SODIUM CHLORIDE: 9 INJECTION, SOLUTION INTRAVENOUS at 10:53

## 2019-05-16 RX ADMIN — PROPOFOL 40 MG: 10 INJECTION, EMULSION INTRAVENOUS at 11:00

## 2019-05-16 RX ADMIN — PROPOFOL 30 MG: 10 INJECTION, EMULSION INTRAVENOUS at 11:01

## 2019-05-16 RX ADMIN — LIDOCAINE HYDROCHLORIDE 100 MG: 20 INJECTION, SOLUTION INFILTRATION; PERINEURAL at 10:58

## 2019-05-16 NOTE — ANESTHESIA POSTPROCEDURE EVALUATION
Patient: Otilio Amaral    Procedure Summary     Date:  05/16/19 Room / Location:  [facility] ENDOSCOPY 5 / [facility]    Anesthesia Start:  1053 Anesthesia Stop:  1106    Procedure:  COLONOSCOPY WITH ANESTHESIA (N/A ) Diagnosis:       History of adenomatous polyp of colon      (History of adenomatous polyp of colon [Z86.010])    Surgeon:  Venu Ashley DO Provider:  Arsen Woods CRNA    Anesthesia Type:  MAC ASA Status:  2          Anesthesia Type: MAC  Last vitals  BP   136/79 (05/16/19 0911)   Temp   97.7 °F (36.5 °C) (05/16/19 0911)   Pulse   80 (05/16/19 0911)   Resp   18 (05/16/19 0911)     SpO2   99 % (05/16/19 0911)     Post Anesthesia Care and Evaluation    Patient location during evaluation: PHASE II  Patient participation: complete - patient participated  Level of consciousness: awake  Pain score: 0  Pain management: adequate  Airway patency: patent  Anesthetic complications: No anesthetic complications  PONV Status: none  Cardiovascular status: acceptable  Respiratory status: acceptable  Hydration status: acceptable

## 2019-05-16 NOTE — ANESTHESIA PREPROCEDURE EVALUATION
Anesthesia Evaluation     Patient summary reviewed and Nursing notes reviewed   NPO Solid Status: > 8 hours  NPO Liquid Status: > 2 hours           Airway   Mallampati: I  TM distance: >3 FB  Neck ROM: full  No difficulty expected  Dental      Pulmonary    (-) asthma, sleep apnea, not a smoker  Cardiovascular   Exercise tolerance: good (4-7 METS)    (+) hypertension, hyperlipidemia,       Neuro/Psych  (-) seizures, TIA, CVA  GI/Hepatic/Renal/Endo    (+)  GERD,    (-) liver disease, no renal disease (congenital single kkidney), diabetes    Musculoskeletal     Abdominal    Substance History      OB/GYN          Other   (+) arthritis                     Anesthesia Plan    ASA 2     MAC     intravenous induction   Anesthetic plan, all risks, benefits, and alternatives have been provided, discussed and informed consent has been obtained with: patient.

## 2019-07-09 ENCOUNTER — TELEPHONE (OUTPATIENT)
Dept: GASTROENTEROLOGY | Facility: CLINIC | Age: 69
End: 2019-07-09

## 2021-04-04 PROCEDURE — U0005 INFEC AGEN DETEC AMPLI PROBE: HCPCS | Performed by: NURSE PRACTITIONER

## 2021-04-04 PROCEDURE — U0004 COV-19 TEST NON-CDC HGH THRU: HCPCS | Performed by: NURSE PRACTITIONER

## 2023-12-09 ENCOUNTER — HOSPITAL ENCOUNTER (EMERGENCY)
Age: 73
Discharge: HOME | End: 2023-12-09
Attending: EMERGENCY MEDICINE
Payer: MEDICARE

## 2023-12-09 ENCOUNTER — APPOINTMENT (OUTPATIENT)
Dept: CT IMAGING | Age: 73
End: 2023-12-09
Payer: MEDICARE

## 2023-12-09 VITALS
HEIGHT: 66 IN | OXYGEN SATURATION: 96 % | WEIGHT: 150 LBS | RESPIRATION RATE: 13 BRPM | SYSTOLIC BLOOD PRESSURE: 139 MMHG | HEART RATE: 77 BPM | BODY MASS INDEX: 24.11 KG/M2 | TEMPERATURE: 98.2 F | DIASTOLIC BLOOD PRESSURE: 74 MMHG

## 2023-12-09 DIAGNOSIS — K62.5 RECTAL BLEEDING: ICD-10-CM

## 2023-12-09 DIAGNOSIS — K62.89 PROCTITIS: Primary | ICD-10-CM

## 2023-12-09 LAB
ALBUMIN SERPL-MCNC: 4.4 G/DL (ref 3.5–5.2)
ALP SERPL-CCNC: 41 U/L (ref 40–130)
ALT SERPL-CCNC: 14 U/L (ref 5–41)
ANION GAP SERPL CALCULATED.3IONS-SCNC: 11 MMOL/L (ref 7–19)
AST SERPL-CCNC: 19 U/L (ref 5–40)
BASOPHILS # BLD: 0.1 K/UL (ref 0–0.2)
BASOPHILS NFR BLD: 0.5 % (ref 0–1)
BILIRUB SERPL-MCNC: 0.9 MG/DL (ref 0.2–1.2)
BUN SERPL-MCNC: 14 MG/DL (ref 8–23)
CALCIUM SERPL-MCNC: 9.7 MG/DL (ref 8.8–10.2)
CHLORIDE SERPL-SCNC: 96 MMOL/L (ref 98–111)
CO2 SERPL-SCNC: 26 MMOL/L (ref 22–29)
CREAT SERPL-MCNC: 0.9 MG/DL (ref 0.5–1.2)
EOSINOPHIL # BLD: 0.3 K/UL (ref 0–0.6)
EOSINOPHIL NFR BLD: 2.4 % (ref 0–5)
ERYTHROCYTE [DISTWIDTH] IN BLOOD BY AUTOMATED COUNT: 13.2 % (ref 11.5–14.5)
GLUCOSE SERPL-MCNC: 113 MG/DL (ref 74–109)
HCT VFR BLD AUTO: 46.7 % (ref 42–52)
HGB BLD-MCNC: 15.4 G/DL (ref 14–18)
IMM GRANULOCYTES # BLD: 0.1 K/UL
LYMPHOCYTES # BLD: 1.9 K/UL (ref 1.1–4.5)
LYMPHOCYTES NFR BLD: 14.8 % (ref 20–40)
MCH RBC QN AUTO: 29.6 PG (ref 27–31)
MCHC RBC AUTO-ENTMCNC: 33 G/DL (ref 33–37)
MCV RBC AUTO: 89.6 FL (ref 80–94)
MONOCYTES # BLD: 1.4 K/UL (ref 0–0.9)
MONOCYTES NFR BLD: 10.5 % (ref 0–10)
NEUTROPHILS # BLD: 9.2 K/UL (ref 1.5–7.5)
NEUTS SEG NFR BLD: 71.4 % (ref 50–65)
PLATELET # BLD AUTO: 257 K/UL (ref 130–400)
PMV BLD AUTO: 10.9 FL (ref 9.4–12.4)
POTASSIUM SERPL-SCNC: 4.2 MMOL/L (ref 3.5–5)
PROT SERPL-MCNC: 7.8 G/DL (ref 6.6–8.7)
RBC # BLD AUTO: 5.21 M/UL (ref 4.7–6.1)
SODIUM SERPL-SCNC: 133 MMOL/L (ref 136–145)
WBC # BLD AUTO: 12.8 K/UL (ref 4.8–10.8)

## 2023-12-09 PROCEDURE — 99285 EMERGENCY DEPT VISIT HI MDM: CPT

## 2023-12-09 PROCEDURE — 36415 COLL VENOUS BLD VENIPUNCTURE: CPT

## 2023-12-09 PROCEDURE — 6360000004 HC RX CONTRAST MEDICATION: Performed by: EMERGENCY MEDICINE

## 2023-12-09 PROCEDURE — 96361 HYDRATE IV INFUSION ADD-ON: CPT

## 2023-12-09 PROCEDURE — 85025 COMPLETE CBC W/AUTO DIFF WBC: CPT

## 2023-12-09 PROCEDURE — 80053 COMPREHEN METABOLIC PANEL: CPT

## 2023-12-09 PROCEDURE — 96360 HYDRATION IV INFUSION INIT: CPT

## 2023-12-09 PROCEDURE — 74177 CT ABD & PELVIS W/CONTRAST: CPT

## 2023-12-09 PROCEDURE — 2580000003 HC RX 258: Performed by: EMERGENCY MEDICINE

## 2023-12-09 RX ORDER — HYDROCORTISONE ACETATE 25 MG/1
25 SUPPOSITORY RECTAL 2 TIMES DAILY
Qty: 12 SUPPOSITORY | Refills: 0 | Status: SHIPPED | OUTPATIENT
Start: 2023-12-09

## 2023-12-09 RX ORDER — ATORVASTATIN CALCIUM 10 MG/1
10 TABLET, FILM COATED ORAL DAILY
COMMUNITY

## 2023-12-09 RX ORDER — ASPIRIN 81 MG/1
81 TABLET ORAL DAILY
COMMUNITY

## 2023-12-09 RX ORDER — OMEPRAZOLE 20 MG/1
20 CAPSULE, DELAYED RELEASE ORAL DAILY
COMMUNITY

## 2023-12-09 RX ORDER — SODIUM CHLORIDE, SODIUM LACTATE, POTASSIUM CHLORIDE, AND CALCIUM CHLORIDE .6; .31; .03; .02 G/100ML; G/100ML; G/100ML; G/100ML
1000 INJECTION, SOLUTION INTRAVENOUS ONCE
Status: COMPLETED | OUTPATIENT
Start: 2023-12-09 | End: 2023-12-09

## 2023-12-09 RX ORDER — CIPROFLOXACIN 500 MG/1
500 TABLET, FILM COATED ORAL 2 TIMES DAILY
Qty: 20 TABLET | Refills: 0 | Status: SHIPPED | OUTPATIENT
Start: 2023-12-09 | End: 2023-12-19

## 2023-12-09 RX ORDER — DICYCLOMINE HCL 20 MG
20 TABLET ORAL EVERY 6 HOURS
COMMUNITY

## 2023-12-09 RX ORDER — HYDROCODONE BITARTRATE AND ACETAMINOPHEN 7.5; 325 MG/1; MG/1
1 TABLET ORAL EVERY 6 HOURS PRN
COMMUNITY

## 2023-12-09 RX ORDER — METRONIDAZOLE 500 MG/1
500 TABLET ORAL 3 TIMES DAILY
Qty: 30 TABLET | Refills: 0 | Status: SHIPPED | OUTPATIENT
Start: 2023-12-09 | End: 2023-12-19

## 2023-12-09 RX ORDER — LISINOPRIL 10 MG/1
10 TABLET ORAL DAILY
COMMUNITY

## 2023-12-09 RX ADMIN — SODIUM CHLORIDE, POTASSIUM CHLORIDE, SODIUM LACTATE AND CALCIUM CHLORIDE 1000 ML: 600; 310; 30; 20 INJECTION, SOLUTION INTRAVENOUS at 08:21

## 2023-12-09 RX ADMIN — IOPAMIDOL 70 ML: 755 INJECTION, SOLUTION INTRAVENOUS at 08:40

## 2023-12-09 ASSESSMENT — ENCOUNTER SYMPTOMS
VOICE CHANGE: 0
ABDOMINAL PAIN: 0
EYE DISCHARGE: 0
APNEA: 0
FACIAL SWELLING: 0
CONSTIPATION: 0
SORE THROAT: 0
NAUSEA: 0
SHORTNESS OF BREATH: 0
RECTAL PAIN: 1
DIARRHEA: 0
SINUS PRESSURE: 0
ANAL BLEEDING: 1
CHOKING: 0
BLOOD IN STOOL: 0

## 2023-12-09 NOTE — DISCHARGE INSTRUCTIONS
Follow-up is very important. You may need to be reevaluated with your gastroenterologist as well. Return if symptoms worsen or change.

## 2023-12-09 NOTE — ED PROVIDER NOTES
[facility] EMERGENCY DEPT  eMERGENCY dEPARTMENT eNCOUnter      Pt Name: Yasemin Astudillo  MRN: [id number]  [address] [facility] [place] 1950  Date of evaluation: 12/9/2023  Provider: MD Santana Fragoso [facility]       Chief Complaint   Patient presents with    Rectal Bleeding     Started Wednesday; bright red blood         HISTORY OF PRESENT ILLNESS   (Location/Symptom, Timing/Onset,Context/Setting, Quality, Duration, Modifying Factors, Severity)  Note limiting factors. Yasemin Astudillo is a 74 y.o. male who presents to the emergency department evaluation of rectal pain and bleeding    68-year-old male developed rectal pain this past Wednesday out of the blue no associated illness or other situation at the time. Thursday started passing some bright red blood when he wiped his rectum. He has not been eating or drinking the past 2 to 3 days because he is afraid to have a BM. He does have a history of diverticulitis. He is denying any fever or injury. He had a colonoscopy in 2021 history of adenomatous polyps. He takes aspirin daily but no other blood thinners. He denies any difficulty with urination. He has had some episodes of nausea but no vomiting. He has had some periods where his pain would not be exacerbated. He felt like he was going to pass out the other day. Right now he seems comfortable. He denies any needs for pain medicine or nausea control at this time    The history is provided by the patient. NursingNotes were reviewed. REVIEW OF SYSTEMS    (2-9 systems for level 4, 10 or more for level 5)     Review of Systems   Constitutional:  Positive for appetite change. Negative for chills and fever. HENT:  Negative for congestion, drooling, facial swelling, nosebleeds, sinus pressure, sore throat and voice change. Eyes:  Negative for discharge. Respiratory:  Negative for apnea, choking and shortness of breath. Cardiovascular:  Negative for chest pain and leg swelling.    Gastrointestinal:

## (undated) DEVICE — ENDOGATOR AUXILIARY WATER JET CONNECTOR: Brand: ENDOGATOR

## (undated) DEVICE — THE CHANNEL CLEANING BRUSH IS A NYLON FLEXI BRUSH ATTACHED TO A FLEXIBLE PLASTIC SHEATH DESIGNED TO SAFELY REMOVE DEBRIS FROM FLEXIBLE ENDOSCOPES.

## (undated) DEVICE — MASK,OXYGEN,MED CONC,ADLT,7' TUB, UC: Brand: PENDING

## (undated) DEVICE — TBG SMPL FLTR LINE NASL 02/C02 A/ BX/100

## (undated) DEVICE — Device: Brand: DEFENDO AIR/WATER/SUCTION AND BIOPSY VALVE

## (undated) DEVICE — YANKAUER,BULB TIP WITH VENT: Brand: ARGYLE

## (undated) DEVICE — SNAR POLYP CAPTIVATOR MICROHEX 13 240CM

## (undated) DEVICE — FRCP BX RADJAW4 NDL 2.8 240 STD OG

## (undated) DEVICE — CONMED SCOPE SAVER BITE BLOCK, 20X27 MM: Brand: SCOPE SAVER

## (undated) DEVICE — CUFF,BP,DISP,1 TUBE,ADULT,HP: Brand: MEDLINE

## (undated) DEVICE — THE SINGLE USE ETRAP – POLYP TRAP IS USED FOR SUCTION RETRIEVAL OF ENDOSCOPICALLY REMOVED POLYPS.: Brand: ETRAP

## (undated) DEVICE — SENSR O2 OXIMAX FNGR A/ 18IN NONSTR